# Patient Record
Sex: FEMALE | Race: WHITE | NOT HISPANIC OR LATINO | URBAN - NONMETROPOLITAN AREA
[De-identification: names, ages, dates, MRNs, and addresses within clinical notes are randomized per-mention and may not be internally consistent; named-entity substitution may affect disease eponyms.]

---

## 2019-04-19 ENCOUNTER — IMPORTED ENCOUNTER (OUTPATIENT)
Dept: URBAN - NONMETROPOLITAN AREA CLINIC 1 | Facility: CLINIC | Age: 84
End: 2019-04-19

## 2019-04-19 PROCEDURE — 92015 DETERMINE REFRACTIVE STATE: CPT

## 2019-04-19 PROCEDURE — 92014 COMPRE OPH EXAM EST PT 1/>: CPT

## 2019-04-19 NOTE — PATIENT DISCUSSION
Presbyopia OUDiscussed refractive status with patient. New glasses Rx given today. Continue to monitor. KANIKA OUDiscussed diagnosis in detail with patient. 1+SPK noted OU today. Recommend using TheraTears Extra BID/PRN sample given today Will continue to monitorPseudophakia OUDiscussed diagnosis in detail with patient. Both intraocular lenses in place today and stable. Continue to monitor.

## 2019-08-14 ENCOUNTER — IMPORTED ENCOUNTER (OUTPATIENT)
Dept: URBAN - NONMETROPOLITAN AREA CLINIC 1 | Facility: CLINIC | Age: 84
End: 2019-08-14

## 2019-08-14 PROBLEM — H52.4: Noted: 2019-08-14

## 2019-08-14 PROBLEM — H00.025: Noted: 2019-08-14

## 2019-08-14 PROBLEM — H04.123: Noted: 2019-08-14

## 2019-08-14 PROBLEM — Z96.1: Noted: 2019-08-14

## 2019-08-14 PROCEDURE — 99213 OFFICE O/P EST LOW 20 MIN: CPT

## 2019-08-14 NOTE — PATIENT DISCUSSION
[facility] - Discussed diagnosis in detail with patient - (-) Wilver Sierra today- Start HOT compresses 10-15 mins on and 45 mins off - Recommend [employer] baby shampoo to scrub lids daily- Start Besivance BID OS sample given today- Continue to montior - RTC A/S -----------------------------previous notes------------------------Presbyopia OUDiscussed refractive status with patient. New glasses Rx given today. Continue to monitor. KANIKA OUDiscussed diagnosis in detail with patient. 1+SPK noted OU today. Recommend using TheraTears Extra BID/PRN sample given today Will continue to monitorPseudophakia OUDiscussed diagnosis in detail with patient. Both intraocular lenses in place today and stable. Continue to monitor.

## 2019-08-19 ENCOUNTER — IMPORTED ENCOUNTER (OUTPATIENT)
Dept: URBAN - NONMETROPOLITAN AREA CLINIC 1 | Facility: CLINIC | Age: 84
End: 2019-08-19

## 2019-08-19 PROBLEM — Z96.1: Noted: 2019-08-14

## 2019-08-19 PROBLEM — H04.123: Noted: 2019-08-14

## 2019-08-19 PROBLEM — D23.122: Noted: 2019-08-19

## 2019-08-19 PROBLEM — H52.4: Noted: 2019-08-14

## 2019-08-19 PROCEDURE — 92012 INTRM OPH EXAM EST PATIENT: CPT

## 2019-08-19 NOTE — PATIENT DISCUSSION
Lid Lesion LLLDiscussed findings in detail with patient. Continue warm compresses Start Lotemax patrick BID LLL sample given todayRecommend refer to Dr. Deena Olea for evaluation. Patient elects to proceed with evaluation.-----------------------------previous notes------------------------Presbyopia OUDiscussed refractive status with patient. New glasses Rx given today. Continue to monitor. KANIKA OUDiscussed diagnosis in detail with patient. 1+SPK noted OU today. Recommend using TheraTears Extra BID/PRN sample given today Will continue to monitorPseudophakia OUDiscussed diagnosis in detail with patient. Both intraocular lenses in place today and stable. Continue to monitor.

## 2019-09-03 ENCOUNTER — IMPORTED ENCOUNTER (OUTPATIENT)
Dept: URBAN - NONMETROPOLITAN AREA CLINIC 1 | Facility: CLINIC | Age: 84
End: 2019-09-03

## 2019-09-03 PROCEDURE — 92012 INTRM OPH EXAM EST PATIENT: CPT

## 2019-09-03 NOTE — PATIENT DISCUSSION
Suspicious: Basal Cell Carcinoma LLL w/ ulcerated center - Discussed diagnosis in detail with patient - Appears to be suspicious for cancer- Discussed two options in detail with patient: 1. biopsy and send to pathology or 2. Pentagonal Wedge Resection and send to pathology. - Discussed all RBA's associated with each option recommend proceeding with pentagonal wedge resection and sending to pathology patient agrees with plan - Candace to schedule MOS-----------------------------previous notes------------------------Presbyopia OUDiscussed refractive status with patient. New glasses Rx given today. Continue to monitor. KANIKA OUDiscussed diagnosis in detail with patient. 1+SPK noted OU today. Recommend using TheraTears Extra BID/PRN sample given today Will continue to monitorPseudophakia OUDiscussed diagnosis in detail with patient. Both intraocular lenses in place today and stable. Continue to monitor.

## 2019-09-15 ENCOUNTER — IMPORTED ENCOUNTER (OUTPATIENT)
Dept: URBAN - NONMETROPOLITAN AREA CLINIC 1 | Facility: CLINIC | Age: 84
End: 2019-09-15

## 2019-09-15 PROCEDURE — 67966 REVISION OF EYELID: CPT

## 2019-09-30 ENCOUNTER — IMPORTED ENCOUNTER (OUTPATIENT)
Dept: URBAN - NONMETROPOLITAN AREA CLINIC 1 | Facility: CLINIC | Age: 84
End: 2019-09-30

## 2019-09-30 PROBLEM — H16.223: Noted: 2019-09-30

## 2019-09-30 PROBLEM — H52.4: Noted: 2019-08-14

## 2019-09-30 PROBLEM — Z96.1: Noted: 2019-08-14

## 2019-09-30 PROBLEM — Z98.890: Noted: 2019-10-04

## 2019-09-30 PROBLEM — D23.122: Noted: 2019-08-19

## 2019-09-30 PROCEDURE — 99024 POSTOP FOLLOW-UP VISIT: CPT

## 2019-09-30 NOTE — PATIENT DISCUSSION
2 Week POV Pentagonal Wedge Left Lower Lid 9/15/19- Discussed diagnosis in detail with patient - Patient is  stable and doing well - Removed sutures from LLL without complications at slit lamp - Continue E-Mycin ointment as directed - Continue to monitor - RTC A/S with [address] [place] [address]   -----------------------------previous notes------------------------Presbyopia OUDiscussed refractive status with patient. New glasses Rx given today. Continue to monitor. KANIKA OUDiscussed diagnosis in detail with patient. 1+SPK noted OU today. Recommend using TheraTears Extra BID/PRN sample given today Will continue to monitorPseudophakia OUDiscussed diagnosis in detail with patient. Both intraocular lenses in place today and stable. Continue to monitor.

## 2019-10-01 ENCOUNTER — IMPORTED ENCOUNTER (OUTPATIENT)
Dept: URBAN - NONMETROPOLITAN AREA CLINIC 1 | Facility: CLINIC | Age: 84
End: 2019-10-01

## 2019-10-01 PROCEDURE — 99024 POSTOP FOLLOW-UP VISIT: CPT

## 2019-10-01 NOTE — PATIENT DISCUSSION
2 Week POV Pentagonal Wedge Left Lower Lid 9/15/19- Discussed diagnosis in detail with patient - Patient is  stable and doing well - Removed sutures from LLL without complications at slit lamp  would like to  keep last 1 suture in eye x 1 more week. -Continue E-Mycin ointment as directedsmall amount given in office today and    called in refill today to [address] [facility] pathology report with patient today in office report states no malignancy identified. 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 - Continue to monitor - RTC 1 Week POV lizzette Carrero for final suture removal -----------------------------previous notes------------------------Presbyopia OUDiscussed refractive status with patient. New glasses Rx given today. Continue to monitor. KANIKA OUDiscussed diagnosis in detail with patient. 1+SPK noted OU today. Recommend using TheraTears Extra BID/PRN sample given today Will continue to monitorPseudophakia OUDiscussed diagnosis in detail with patient. Both intraocular lenses in place today and stable. Continue to monitor.

## 2019-10-08 ENCOUNTER — IMPORTED ENCOUNTER (OUTPATIENT)
Dept: URBAN - NONMETROPOLITAN AREA CLINIC 1 | Facility: CLINIC | Age: 84
End: 2019-10-08

## 2019-10-08 PROCEDURE — 99024 POSTOP FOLLOW-UP VISIT: CPT

## 2019-10-08 NOTE — PATIENT DISCUSSION
3 Week POV Pentagonal Wedge Left Lower Lid 9/15/19- Discussed diagnosis in detail with patient - Patient is stable and doing well - Removed 1 suture from LLL without complications at slit lamp-Continue E-Mycin ointment x 1 more week - Continue to monitor - RTC A/S or PRN  -----------------------------previous notes------------------------Presbyopia OUDiscussed refractive status with patient. New glasses Rx given today. Continue to monitor. KANIKA OUDiscussed diagnosis in detail with patient. 1+SPK noted OU today. Recommend using TheraTears Extra BID/PRN sample given today Will continue to monitorPseudophakia OUDiscussed diagnosis in detail with patient. Both intraocular lenses in place today and stable. Continue to monitor.

## 2020-07-24 ENCOUNTER — IMPORTED ENCOUNTER (OUTPATIENT)
Dept: URBAN - NONMETROPOLITAN AREA CLINIC 1 | Facility: CLINIC | Age: 85
End: 2020-07-24

## 2020-07-24 PROBLEM — Z96.1: Noted: 2020-07-24

## 2020-07-24 PROBLEM — H52.4: Noted: 2020-07-24

## 2020-07-24 PROBLEM — H16.223: Noted: 2020-07-24

## 2020-07-24 PROCEDURE — 92014 COMPRE OPH EXAM EST PT 1/>: CPT

## 2020-07-24 PROCEDURE — 92015 DETERMINE REFRACTIVE STATE: CPT

## 2020-07-24 NOTE — PATIENT DISCUSSION
Presbyopia OUDiscussed refractive status with patient. New glasses Rx given today but ok to continue OTC readers. Continue to monitor. KANIKA OUDiscussed diagnosis in detail with patient. Trace SPK noted OU today. Continue TheraTears Extra BID/PRN. Will continue to monitorPseudophakia OUDiscussed diagnosis in detail with patient. Both intraocular lenses in place today and stable. Continue to monitor.

## 2020-08-28 ENCOUNTER — IMPORTED ENCOUNTER (OUTPATIENT)
Dept: URBAN - NONMETROPOLITAN AREA CLINIC 1 | Facility: CLINIC | Age: 85
End: 2020-08-28

## 2020-08-28 PROBLEM — Z96.1: Noted: 2020-07-24

## 2020-08-28 PROBLEM — H10.422: Noted: 2020-08-28

## 2020-08-28 PROBLEM — H16.223: Noted: 2020-07-24

## 2020-08-28 PROCEDURE — 92012 INTRM OPH EXAM EST PATIENT: CPT

## 2020-08-28 NOTE — PATIENT DISCUSSION
Ocular Allergies OSDiscussed diagnosis in detail with patient. Signs/symptoms discussed with patient. 1+papillae with concretion noted OS. Start Durezol BID OS X 1 week then QD X 1 week then D/C. Continue to monitor. KANIKA OUDiscussed diagnosis in detail with patient. Trace SPK noted OU today. Continue TheraTears Extra BID/PRN. Will continue to monitorPseudophakia OUDiscussed diagnosis in detail with patient. Both intraocular lenses in place today and stable. Continue to monitor.

## 2021-07-26 ENCOUNTER — IMPORTED ENCOUNTER (OUTPATIENT)
Dept: URBAN - NONMETROPOLITAN AREA CLINIC 1 | Facility: CLINIC | Age: 86
End: 2021-07-26

## 2021-07-26 PROBLEM — H26.492: Noted: 2021-07-26

## 2021-07-26 PROBLEM — Z96.1: Noted: 2021-07-26

## 2021-07-26 PROBLEM — H16.223: Noted: 2021-07-26

## 2021-07-26 PROBLEM — H52.4: Noted: 2021-07-26

## 2021-07-26 PROCEDURE — 92014 COMPRE OPH EXAM EST PT 1/>: CPT

## 2021-07-26 PROCEDURE — 92015 DETERMINE REFRACTIVE STATE: CPT

## 2021-07-26 NOTE — PATIENT DISCUSSION
KANIKA OUDiscussed diagnosis in detail with patient. Trace SPK noted OU today. Continue TheraTears Extra BID/PRN. Will continue to monitorPseudophakia OUDiscussed diagnosis in detail with patient. Both intraocular lenses in place today and stable. Mild PCO noted OS but not visually significant. Continue to monitor. Presbyopia OUDiscussed refractive status in detail with patient. New glasses Rx given today. Continue to monitor. No

## 2021-09-27 ENCOUNTER — IMPORTED ENCOUNTER (OUTPATIENT)
Dept: URBAN - NONMETROPOLITAN AREA CLINIC 1 | Facility: CLINIC | Age: 86
End: 2021-09-27

## 2021-09-27 PROCEDURE — 99213 OFFICE O/P EST LOW 20 MIN: CPT

## 2021-09-27 NOTE — PATIENT DISCUSSION
KANIKA OUDiscussed diagnosis in detail with patient. Trace SPK noted OU today. Continue TheraTears Extra BID/PRN. Will continue to monitorPseudophakia OUDiscussed diagnosis in detail with patient. Both intraocular lenses in place today and stable. Mild PCO noted OS but not visually significant. Continue to monitor. Presbyopia OUDiscussed refractive status in detail with patient. Continue to monitor.

## 2022-04-09 ASSESSMENT — TONOMETRY
OS_IOP_MMHG: 11
OD_IOP_MMHG: 10
OS_IOP_MMHG: 09
OD_IOP_MMHG: 12
OS_IOP_MMHG: 13
OS_IOP_MMHG: 12
OD_IOP_MMHG: 12
OS_IOP_MMHG: 12
OS_IOP_MMHG: 12
OD_IOP_MMHG: 11
OD_IOP_MMHG: 12
OS_IOP_MMHG: 11

## 2022-04-09 ASSESSMENT — VISUAL ACUITY
OS_CC: 20/25-
OD_CC: 20/50-
OS_CC: 20/30-
OD_CC: 20/40-2
OD_PH: 20/40+
OD_CC: 20/40-2
OD_CC: 20/50-
OD_CC: 20/50-
OD_CC: 20/40
OS_CC: 20/80-
OD_PH: 20/25-
OS_CC: 20/30-
OD_PH: 20/40
OD_PH: 20/29+
OS_PH: 20/40-
OD_CC: 20/40-
OD_PH: 20/32
OD_CC: 20/50+
OS_CC: 20/25-
OS_CC: 20/25
OS_CC: 20/40
OS_CC: 20/50+
OD_PH: 20/30-2
OD_CC: 20/50+
OD_CC: 20/50
OD_PH: 20/40
OS_CC: 20/30-2
OS_CC: 20/25-
OD_PH: 20/30
OD_CC: 20/40+
OD_PH: 20/40+
OS_CC: 20/25
OS_PH: 20/25

## 2022-07-28 ENCOUNTER — ESTABLISHED PATIENT (OUTPATIENT)
Dept: URBAN - NONMETROPOLITAN AREA CLINIC 1 | Facility: CLINIC | Age: 87
End: 2022-07-28

## 2022-07-28 DIAGNOSIS — H52.4: ICD-10-CM

## 2022-07-28 PROCEDURE — 92015 DETERMINE REFRACTIVE STATE: CPT

## 2022-07-28 PROCEDURE — 92014 COMPRE OPH EXAM EST PT 1/>: CPT

## 2022-07-28 ASSESSMENT — VISUAL ACUITY
OS_SC: 20/25
OD_SC: 20/40-2
OD_PH: 20/40

## 2022-07-28 ASSESSMENT — TONOMETRY
OD_IOP_MMHG: 13
OS_IOP_MMHG: 13

## 2023-10-12 ENCOUNTER — FOLLOW UP (OUTPATIENT)
Dept: URBAN - NONMETROPOLITAN AREA CLINIC 1 | Facility: CLINIC | Age: 88
End: 2023-10-12

## 2023-10-12 DIAGNOSIS — H52.4: ICD-10-CM

## 2023-10-12 DIAGNOSIS — H16.223: ICD-10-CM

## 2023-10-12 DIAGNOSIS — H26.493: ICD-10-CM

## 2023-10-12 PROCEDURE — 99214 OFFICE O/P EST MOD 30 MIN: CPT

## 2023-10-12 PROCEDURE — 92015 DETERMINE REFRACTIVE STATE: CPT

## 2023-10-12 ASSESSMENT — VISUAL ACUITY
OD_SC: 20/40-2
OS_SC: 20/22-2

## 2023-10-12 ASSESSMENT — TONOMETRY
OS_IOP_MMHG: 12
OD_IOP_MMHG: 12

## 2025-05-22 ENCOUNTER — COMPREHENSIVE EXAM (OUTPATIENT)
Age: OVER 89
End: 2025-05-22

## 2025-05-22 DIAGNOSIS — Z96.1: ICD-10-CM

## 2025-05-22 DIAGNOSIS — H43.813: ICD-10-CM

## 2025-05-22 DIAGNOSIS — H16.223: ICD-10-CM

## 2025-05-22 DIAGNOSIS — H26.493: ICD-10-CM

## 2025-05-22 PROCEDURE — 99213 OFFICE O/P EST LOW 20 MIN: CPT
